# Patient Record
Sex: FEMALE | Race: ASIAN
[De-identification: names, ages, dates, MRNs, and addresses within clinical notes are randomized per-mention and may not be internally consistent; named-entity substitution may affect disease eponyms.]

---

## 2018-06-13 ENCOUNTER — HOSPITAL ENCOUNTER (EMERGENCY)
Dept: HOSPITAL 53 - M ED | Age: 18
LOS: 1 days | Discharge: HOME | End: 2018-06-14
Payer: SELF-PAY
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
## 2018-06-13 DIAGNOSIS — T14.8XXA: Primary | ICD-10-CM
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
## 2018-06-13 DIAGNOSIS — Z79.899: ICD-10-CM

## 2018-06-13 PROCEDURE — 70450 CT HEAD/BRAIN W/O DYE: CPT

## 2018-06-13 RX ADMIN — ACETAMINOPHEN 1 MG: 325 TABLET ORAL at 22:47

## 2021-09-28 ENCOUNTER — HOSPITAL ENCOUNTER (OUTPATIENT)
Dept: HOSPITAL 53 - M RAD | Age: 21
End: 2021-09-28
Attending: NURSE PRACTITIONER
Payer: COMMERCIAL

## 2021-09-28 DIAGNOSIS — R19.00: Primary | ICD-10-CM

## 2021-09-28 NOTE — REP
INDICATION:

PELVIC PAIN



COMPARISON:

None.



TECHNIQUE:

Transabdominal pelvic ultrasound followed by transvaginal examination for better

evaluation of the endometrium and adnexa with color Doppler evaluation of the ovaries.



FINDINGS:

Bladder is unremarkable and measures 8.8 x 8.6 x 10.4 cm.



Normal anteverted uterus measures 6.8 x 2.9 x 4.0 cm.  The endometrial complex

measures 3.9 mm thickness.  Small nabothian cysts up to 7 mm noted.



Bilateral ovaries are normal in appearance and vascularity without evidence for

torsion.  Right ovary measures 2.7 x 2.4 x 2.2 cm; R I = 0.64.  Left ovary measures

3.0 x 1.9 x 1.9 cm; R I = 0.55.



No pelvic fluid or adnexal mass lesion.



IMPRESSION:

Normal pelvic ultrasound.





<Electronically signed by Arley Fitzgerald > 09/28/21 [id number]

## 2022-10-31 ENCOUNTER — HOSPITAL ENCOUNTER (EMERGENCY)
Dept: HOSPITAL 53 - M ED | Age: 22
Discharge: HOME | End: 2022-10-31
Payer: COMMERCIAL

## 2022-10-31 VITALS — SYSTOLIC BLOOD PRESSURE: 117 MMHG | DIASTOLIC BLOOD PRESSURE: 66 MMHG

## 2022-10-31 VITALS — WEIGHT: 180.38 LBS | BODY MASS INDEX: 27.34 KG/M2 | HEIGHT: 68 IN

## 2022-10-31 DIAGNOSIS — R06.02: ICD-10-CM

## 2022-10-31 DIAGNOSIS — R51.9: ICD-10-CM

## 2022-10-31 DIAGNOSIS — M79.10: ICD-10-CM

## 2022-10-31 DIAGNOSIS — K59.00: ICD-10-CM

## 2022-10-31 DIAGNOSIS — R63.0: ICD-10-CM

## 2022-10-31 DIAGNOSIS — S80.02XA: Primary | ICD-10-CM

## 2022-10-31 DIAGNOSIS — R11.0: ICD-10-CM

## 2022-10-31 DIAGNOSIS — J02.9: ICD-10-CM

## 2022-10-31 DIAGNOSIS — Z87.891: ICD-10-CM

## 2022-10-31 DIAGNOSIS — G47.00: ICD-10-CM

## 2022-10-31 LAB
ALBUMIN SERPL BCG-MCNC: 3.6 GM/DL (ref 3.2–5.2)
ALT SERPL W P-5'-P-CCNC: 23 U/L (ref 12–78)
APTT BLD: 29.2 SECONDS (ref 24.8–34.2)
BASOPHILS # BLD AUTO: 0 10^3/UL (ref 0–0.2)
BASOPHILS NFR BLD AUTO: 0.4 % (ref 0–1)
BILIRUB SERPL-MCNC: 1 MG/DL (ref 0.2–1)
BUN SERPL-MCNC: 8 MG/DL (ref 7–18)
CALCIUM SERPL-MCNC: 9.2 MG/DL (ref 8.5–10.1)
CHLORIDE SERPL-SCNC: 106 MEQ/L (ref 98–107)
CO2 SERPL-SCNC: 30 MEQ/L (ref 21–32)
CREAT SERPL-MCNC: 0.86 MG/DL (ref 0.55–1.3)
EOSINOPHIL # BLD AUTO: 0.3 10^3/UL (ref 0–0.5)
EOSINOPHIL NFR BLD AUTO: 3.9 % (ref 0–3)
FT4I SERPL CALC-MCNC: 2.4 % (ref 1.3–4.8)
GFR SERPL CREATININE-BSD FRML MDRD: > 60 ML/MIN/{1.73_M2} (ref 60–?)
GLUCOSE SERPL-MCNC: 85 MG/DL (ref 70–100)
HCT VFR BLD AUTO: 40.5 % (ref 36–47)
HETEROPH AB SER QL LA: NEGATIVE
HGB BLD-MCNC: 13.3 G/DL (ref 12–15.5)
INR PPP: 1.02
LYMPHOCYTES # BLD AUTO: 1.7 10^3/UL (ref 1.5–5)
LYMPHOCYTES NFR BLD AUTO: 25.8 % (ref 24–44)
MCH RBC QN AUTO: 31.4 PG (ref 27–33)
MCHC RBC AUTO-ENTMCNC: 32.8 G/DL (ref 32–36.5)
MCV RBC AUTO: 95.5 FL (ref 80–96)
MONOCYTES # BLD AUTO: 0.4 10^3/UL (ref 0–0.8)
MONOCYTES NFR BLD AUTO: 5.8 % (ref 2–8)
NEUTROPHILS # BLD AUTO: 4.3 10^3/UL (ref 1.5–8.5)
NEUTROPHILS NFR BLD AUTO: 64 % (ref 36–66)
PLATELET # BLD AUTO: 175 10^3/UL (ref 150–450)
POTASSIUM SERPL-SCNC: 3.7 MEQ/L (ref 3.5–5.1)
PROT SERPL-MCNC: 7.5 GM/DL (ref 6.4–8.2)
PROTHROMBIN TIME: 13.6 SECONDS (ref 12.5–14.5)
RBC # BLD AUTO: 4.24 10^6/UL (ref 4–5.4)
SODIUM SERPL-SCNC: 139 MEQ/L (ref 136–145)
T3RU NFR SERPL: 34 % (ref 30–39)
T4 SERPL-MCNC: 7.1 UG/DL (ref 4.5–12)
TSH SERPL DL<=0.005 MIU/L-ACNC: 0.89 UIU/ML (ref 0.36–3.74)
WBC # BLD AUTO: 6.8 10^3/UL (ref 4–10)

## 2022-11-02 LAB — EBV NA IGG SER-ACNC: 263 U/ML (ref 0–17.9)

## 2022-11-26 ENCOUNTER — HOSPITAL ENCOUNTER (OUTPATIENT)
Dept: HOSPITAL 53 - M LAB | Age: 22
End: 2022-11-26
Attending: PHYSICIAN ASSISTANT
Payer: COMMERCIAL

## 2022-11-26 DIAGNOSIS — Z82.69: ICD-10-CM

## 2022-11-26 DIAGNOSIS — R58: ICD-10-CM

## 2022-11-26 DIAGNOSIS — M25.50: Primary | ICD-10-CM

## 2022-11-26 LAB — CLOSURE TME COLL+EPINEP BLD: 95 SECONDS (ref 74–162)

## 2022-11-29 ENCOUNTER — HOSPITAL ENCOUNTER (OUTPATIENT)
Dept: HOSPITAL 53 - M RAD | Age: 22
End: 2022-11-29
Attending: PHYSICIAN ASSISTANT
Payer: COMMERCIAL

## 2022-11-29 DIAGNOSIS — Z82.69: Primary | ICD-10-CM

## 2022-11-29 DIAGNOSIS — M54.6: ICD-10-CM

## 2022-11-29 LAB — CLOSURE TME COLL+EPINEP BLD: 103 SECONDS (ref 74–162)

## 2022-12-22 ENCOUNTER — HOSPITAL ENCOUNTER (OUTPATIENT)
Dept: HOSPITAL 53 - M WHC | Age: 22
End: 2022-12-22
Attending: PHYSICIAN ASSISTANT
Payer: COMMERCIAL

## 2022-12-22 DIAGNOSIS — N64.4: ICD-10-CM

## 2022-12-22 DIAGNOSIS — N63.11: Primary | ICD-10-CM
